# Patient Record
(demographics unavailable — no encounter records)

---

## 2024-10-29 NOTE — PHYSICAL EXAM
[General Appearance - Alert] : alert [Sclera] : the sclera and conjunctiva were normal [Neck Appearance] : the appearance of the neck was normal [Auscultation Breath Sounds / Voice Sounds] : lungs were clear to auscultation bilaterally [Heart Rate And Rhythm] : heart rate was normal and rhythm regular [Heart Sounds] : normal S1 and S2 [No CVA Tenderness] : no ~M costovertebral angle tenderness [FreeTextEntry1] : mild ttp over upper and lower body articular and non articular structures [] : no rash [No Focal Deficits] : no focal deficits [Oriented To Time, Place, And Person] : oriented to person, place, and time

## 2024-10-29 NOTE — HISTORY OF PRESENT ILLNESS
[FreeTextEntry1] : 53yo F in the office for evaluation, PMHx prediabtes, HTN, GERD  History: chronic pain widespread predominant lower ext daily symptoms workup negative for secondary inflammatory causes diagnosed with fibromyalgia 2022 [place] evaluation started on Duloxetine with progressive significant clinical improvement no new medical events since last office visit 2023 schedule for ankle surgery Dec 2024, Achilles tendon tear, chronic ankle pain
medium

## 2024-12-13 NOTE — RESULTS/DATA
[] : results reviewed [de-identified] : Nml [de-identified] : PT nml [de-identified] : Nml [de-identified] : Nml

## 2024-12-13 NOTE — RESULTS/DATA
[] : results reviewed [de-identified] : Nml [de-identified] : PT nml [de-identified] : Nml [de-identified] : Nml

## 2024-12-13 NOTE — HISTORY OF PRESENT ILLNESS
[No Pertinent Cardiac History] : no history of aortic stenosis, atrial fibrillation, coronary artery disease, recent myocardial infarction, or implantable device/pacemaker [Sleep Apnea] : sleep apnea [(Patient denies any chest pain, claudication, dyspnea on exertion, orthopnea, palpitations or syncope)] : Patient denies any chest pain, claudication, dyspnea on exertion, orthopnea, palpitations or syncope [Asthma] : no asthma [COPD] : no COPD [Smoker] : not a smoker [Family Member] : no family member with adverse anesthesia reaction/sudden death [Self] : no previous adverse anesthesia reaction [Chronic Anticoagulation] : no chronic anticoagulation [Chronic Kidney Disease] : no chronic kidney disease [Diabetes] : no diabetes [FreeTextEntry1] : Left Achilles Tendon Repair [FreeTextEntry2] : 12/20/24 [FreeTextEntry3] : Dr. Hannah Bolton [FreeTextEntry4] : Ms. TASHA ENCINAS is a 52 year old female here today for preoperative clearance.

## 2025-05-20 NOTE — HISTORY OF PRESENT ILLNESS
[___ Month(s) Ago] : [unfilled] month(s) ago [FreeTextEntry1] : Patient with fibromyalgia, mild symptoms increase dose to 60 mg daily had surgery dec, still recovering, on PT